# Patient Record
(demographics unavailable — no encounter records)

---

## 2021-02-08 NOTE — BD
DEXA BONE MINERAL DENSITY STUDY:

 

HISTORY: 

Osteoporosis screening.

 

COMPARISON: 

None.

 

FINDINGS: 

 

Lumbar Spine:       BMD (g/cm2)

    L1                0.727              T-Score: -2.4   -0.6

    L2                0.785              T-Score: -2.2   -0.2

    L3                0.786              T-Score: -2.7   -0.6

    L4                0.711              T-Score: -3.2   -1.0

 

    L1-L4             0.751              T-Score: -2.7   -0.6

 

Femoral Neck:         0.583              T-Score: -2.4   -0.7

 

Total Femur:          0.832              T-Score: -0.9    0.5

 

WHO CLASSIFICATION: 

Osteoporosis.

 

Impression:

Osteoporosis with elevated fracture risk.

 

POS: HOME